# Patient Record
(demographics unavailable — no encounter records)

---

## 2018-03-19 NOTE — DIAGNOSTIC IMAGING REPORT
EXAM: WRIST COMPLETE RIGHT, HAND 3+ VIEWS RIGHT, AP, lateral and oblique

INDICATION: Pain in right wrist and right hand after wrestling with sibling,

sprained right hand

COMPARISON: None



FINDINGS:

BONES: 

No acute fractures.



JOINTS:

No malalignment.



SOFT TISSUES:

Normal



IMPRESSION:

No evidence of a right hand or wrist fracture.





Signed by: Dr. Romina Lanier M.D. on 3/19/2018 9:46 PM